# Patient Record
Sex: MALE | Race: BLACK OR AFRICAN AMERICAN | NOT HISPANIC OR LATINO | Employment: FULL TIME | URBAN - METROPOLITAN AREA
[De-identification: names, ages, dates, MRNs, and addresses within clinical notes are randomized per-mention and may not be internally consistent; named-entity substitution may affect disease eponyms.]

---

## 2019-03-20 ENCOUNTER — OUTSIDE PLACE OF SERVICE (OUTPATIENT)
Dept: CARDIOLOGY | Facility: CLINIC | Age: 59
End: 2019-03-20
Payer: COMMERCIAL

## 2019-03-20 PROCEDURE — 93010 PR ELECTROCARDIOGRAM REPORT: ICD-10-PCS | Mod: S$GLB,,, | Performed by: INTERNAL MEDICINE

## 2019-03-20 PROCEDURE — 93010 ELECTROCARDIOGRAM REPORT: CPT | Mod: S$GLB,,, | Performed by: INTERNAL MEDICINE

## 2020-07-07 ENCOUNTER — HOSPITAL ENCOUNTER (EMERGENCY)
Facility: HOSPITAL | Age: 60
Discharge: HOME | End: 2020-07-07
Attending: EMERGENCY MEDICINE
Payer: COMMERCIAL

## 2020-07-07 VITALS
WEIGHT: 180 LBS | HEART RATE: 58 BPM | BODY MASS INDEX: 26.66 KG/M2 | SYSTOLIC BLOOD PRESSURE: 148 MMHG | TEMPERATURE: 99 F | OXYGEN SATURATION: 98 % | HEIGHT: 69 IN | RESPIRATION RATE: 18 BRPM | DIASTOLIC BLOOD PRESSURE: 94 MMHG

## 2020-07-07 DIAGNOSIS — R10.9 FLANK PAIN: Primary | ICD-10-CM

## 2020-07-07 LAB
ALBUMIN SERPL BCP-MCNC: 4.9 G/DL (ref 3.5–5.2)
ALP SERPL-CCNC: 85 U/L (ref 38–126)
ALT SERPL W/O P-5'-P-CCNC: 27 U/L (ref 10–44)
ANION GAP SERPL CALC-SCNC: 10 MMOL/L (ref 8–16)
AST SERPL-CCNC: 30 U/L (ref 15–46)
BASOPHILS # BLD AUTO: 0.02 K/UL (ref 0–0.2)
BASOPHILS NFR BLD: 0.4 % (ref 0–1.9)
BILIRUB SERPL-MCNC: 0.8 MG/DL (ref 0.1–1)
BILIRUB UR QL STRIP: NEGATIVE
BUN SERPL-MCNC: 11 MG/DL (ref 2–20)
CALCIUM SERPL-MCNC: 9.9 MG/DL (ref 8.7–10.5)
CHLORIDE SERPL-SCNC: 103 MMOL/L (ref 95–110)
CLARITY UR REFRACT.AUTO: CLEAR
CO2 SERPL-SCNC: 30 MMOL/L (ref 23–29)
COLOR UR AUTO: YELLOW
CREAT SERPL-MCNC: 0.98 MG/DL (ref 0.5–1.4)
DIFFERENTIAL METHOD: NORMAL
EOSINOPHIL # BLD AUTO: 0 K/UL (ref 0–0.5)
EOSINOPHIL NFR BLD: 0.8 % (ref 0–8)
ERYTHROCYTE [DISTWIDTH] IN BLOOD BY AUTOMATED COUNT: 12.8 % (ref 11.5–14.5)
EST. GFR  (AFRICAN AMERICAN): >60 ML/MIN/1.73 M^2
EST. GFR  (NON AFRICAN AMERICAN): >60 ML/MIN/1.73 M^2
GLUCOSE SERPL-MCNC: 108 MG/DL (ref 70–110)
GLUCOSE UR QL STRIP: NEGATIVE
GROUP A STREP, MOLECULAR: NEGATIVE
HCT VFR BLD AUTO: 45.3 % (ref 40–54)
HGB BLD-MCNC: 15.5 G/DL (ref 14–18)
HGB UR QL STRIP: NEGATIVE
IMM GRANULOCYTES # BLD AUTO: 0.01 K/UL (ref 0–0.04)
IMM GRANULOCYTES NFR BLD AUTO: 0.2 % (ref 0–0.5)
KETONES UR QL STRIP: NEGATIVE
LEUKOCYTE ESTERASE UR QL STRIP: NEGATIVE
LIPASE SERPL-CCNC: 38 U/L (ref 23–300)
LYMPHOCYTES # BLD AUTO: 1.3 K/UL (ref 1–4.8)
LYMPHOCYTES NFR BLD: 25.5 % (ref 18–48)
MCH RBC QN AUTO: 28.7 PG (ref 27–31)
MCHC RBC AUTO-ENTMCNC: 34.2 G/DL (ref 32–36)
MCV RBC AUTO: 84 FL (ref 82–98)
MONOCYTES # BLD AUTO: 0.4 K/UL (ref 0.3–1)
MONOCYTES NFR BLD: 6.8 % (ref 4–15)
NEUTROPHILS # BLD AUTO: 3.4 K/UL (ref 1.8–7.7)
NEUTROPHILS NFR BLD: 66.3 % (ref 38–73)
NITRITE UR QL STRIP: NEGATIVE
NRBC BLD-RTO: 0 /100 WBC
PH UR STRIP: 7 [PH] (ref 5–8)
PLATELET # BLD AUTO: 230 K/UL (ref 150–350)
PMV BLD AUTO: 9.9 FL (ref 9.2–12.9)
POTASSIUM SERPL-SCNC: 4.2 MMOL/L (ref 3.5–5.1)
PROT SERPL-MCNC: 8.8 G/DL (ref 6–8.4)
PROT UR QL STRIP: NEGATIVE
RBC # BLD AUTO: 5.4 M/UL (ref 4.6–6.2)
SODIUM SERPL-SCNC: 143 MMOL/L (ref 136–145)
SP GR UR STRIP: 1.01 (ref 1–1.03)
URN SPEC COLLECT METH UR: NORMAL
UROBILINOGEN UR STRIP-ACNC: NEGATIVE EU/DL
WBC # BLD AUTO: 5.17 K/UL (ref 3.9–12.7)

## 2020-07-07 PROCEDURE — 83690 ASSAY OF LIPASE: CPT | Mod: ER

## 2020-07-07 PROCEDURE — 80053 COMPREHEN METABOLIC PANEL: CPT | Mod: ER

## 2020-07-07 PROCEDURE — 99284 EMERGENCY DEPT VISIT MOD MDM: CPT | Mod: 25,ER

## 2020-07-07 PROCEDURE — 85025 COMPLETE CBC W/AUTO DIFF WBC: CPT | Mod: ER

## 2020-07-07 PROCEDURE — 81003 URINALYSIS AUTO W/O SCOPE: CPT | Mod: ER

## 2020-07-07 PROCEDURE — 87651 STREP A DNA AMP PROBE: CPT | Mod: ER

## 2020-07-07 RX ORDER — LOVASTATIN 40 MG/1
40 TABLET ORAL NIGHTLY
COMMUNITY

## 2020-07-07 RX ORDER — TRAMADOL HYDROCHLORIDE 50 MG/1
50 TABLET ORAL EVERY 12 HOURS PRN
Qty: 6 TABLET | Refills: 0 | Status: SHIPPED | OUTPATIENT
Start: 2020-07-07

## 2020-07-07 RX ORDER — LOSARTAN POTASSIUM 50 MG/1
50 TABLET ORAL DAILY
COMMUNITY

## 2020-07-07 RX ORDER — METFORMIN HYDROCHLORIDE 500 MG/1
500 TABLET ORAL 2 TIMES DAILY WITH MEALS
COMMUNITY

## 2020-07-07 RX ORDER — AMLODIPINE BESYLATE 10 MG/1
10 TABLET ORAL DAILY
COMMUNITY

## 2020-07-07 NOTE — ED PROVIDER NOTES
Encounter Date: 7/7/2020       History     Chief Complaint   Patient presents with    Right sided pain     started 2-3 weeks      59-year-old male presents to the emergency department for evaluation 3 week history of intermittent right-sided flank pain.  He reports that the symptoms began gradually 3 weeks ago and have been intermittent since onset.  He denies any abdominal pain, nausea, vomiting, diarrhea, dysuria or hematuria.  He does report that he had a history of a kidney stone back in 2003 which required surgery to remove the stone..  He denies any nausea, vomiting, diarrhea, fever, headache, dizziness or vision changes.  He does report a mild sore throat that has been intermittent for approximately 1 month.  He denies any chest pain, shortness of breath, cough, palpitations or leg swelling.  He reports that he has not been taking his metformin as he feels like a give some dark spots on his legs.  He reports that he has not been to his primary care provider in several months secondary to COVID protocols.  Patient reports currently he is any flank pain.        Review of patient's allergies indicates:  No Known Allergies  Past Medical History:   Diagnosis Date    Diabetes mellitus     Hyperlipidemia     Hypertension      Past Surgical History:   Procedure Laterality Date    KIDNEY STONE SURGERY       History reviewed. No pertinent family history.  Social History     Tobacco Use    Smoking status: Never Smoker   Substance Use Topics    Alcohol use: Yes     Comment: occ    Drug use: Not on file     Review of Systems   Constitutional: Negative for activity change, appetite change and fever.   HENT: Positive for sore throat. Negative for congestion, nosebleeds, rhinorrhea, sinus pressure, trouble swallowing and voice change.    Eyes: Negative for photophobia and visual disturbance.   Respiratory: Negative for cough, chest tightness, shortness of breath and wheezing.    Cardiovascular: Negative for chest  pain.   Gastrointestinal: Negative for abdominal pain, constipation, diarrhea, nausea and vomiting.   Genitourinary: Positive for flank pain. Negative for decreased urine volume, dysuria, hematuria, penile swelling and scrotal swelling.   Musculoskeletal: Negative for back pain, joint swelling, neck pain and neck stiffness.   Skin: Negative for rash.   Neurological: Negative for dizziness, syncope, weakness, light-headedness, numbness and headaches.   Hematological: Does not bruise/bleed easily.       Physical Exam     Initial Vitals [07/07/20 1037]   BP Pulse Resp Temp SpO2   (!) 185/93 63 18 99 °F (37.2 °C) 98 %      MAP       --         Physical Exam    Nursing note and vitals reviewed.  Constitutional: He appears well-developed and well-nourished. He is not diaphoretic. No distress.   HENT:   Head: Normocephalic and atraumatic.   Right Ear: External ear normal.   Left Ear: External ear normal.   Mouth/Throat: Oropharynx is clear and moist. No oropharyngeal exudate.   Eyes: Conjunctivae and EOM are normal. Pupils are equal, round, and reactive to light.   Neck: Normal range of motion. Neck supple.   Cardiovascular: Normal rate, regular rhythm and normal heart sounds. Exam reveals no gallop and no friction rub.    No murmur heard.  Pulmonary/Chest: Breath sounds normal. No respiratory distress. He has no wheezes. He has no rhonchi. He has no rales. He exhibits no tenderness.   Abdominal: Soft. Bowel sounds are normal. He exhibits no distension. There is no abdominal tenderness. There is CVA tenderness (Right-sided). There is no guarding, no tenderness at McBurney's point and negative Reyes's sign.   Lymphadenopathy:     He has no cervical adenopathy.   Neurological: He is alert and oriented to person, place, and time.   Skin: Skin is warm and dry.   Psychiatric: He has a normal mood and affect.         ED Course   Procedures  Labs Reviewed   GROUP A STREP, MOLECULAR   CBC W/ AUTO DIFFERENTIAL   URINALYSIS, REFLEX  TO URINE CULTURE   COMPREHENSIVE METABOLIC PANEL   LIPASE          Imaging Results    None          Medical Decision Making:   Initial Assessment:   59-year-old male presents for evaluation of 3 week history of intermittent right-sided flank pain.  Physical exam reveals a nontoxic-appearing male in no acute distress.  Patient is mildly hypertensive, but other vital signs are within normal limits.  Neurological exam reveals an alert and oriented patient.  Lungs clear to auscultation bilaterally.  No respiratory distress or accessory muscle use noted.  Abdominal exam reveals soft abdomen, nontender to palpation.  Right-sided CVA tenderness noted.  Patient declined  exam.  Differential Diagnosis:   CT of the abdomen/pelvis ordered to assess possible renal stone.  UTI  Pyelonephritis  Lumbar strain  Hyperglycemia  ED Management:  Urinalysis reveals no evidence of urinary tract infection or hematuria.  Group a strep negative.  CBC reveals no acute leukocytosis or anemia.  CMP results within normal limits.  Lipase 38.  CT of the abdomen/pelvis report reveals no acute findings.  Nonobstructive bilateral nephrolithiasis noted.  A few right-sided renal cysts are noted.  Discussed these findings at length with the patient who verbalizes understanding and agreement course of treatment.  Patient reports that he is currently not in any pain.  Instructed patient to follow up with his primary care provider for re-evaluation and to return to the emergency department immediately for any new or worsening symptoms.  Instructed patient to follow up with urology for re-evaluation and to return to the emergency department immediately for any new or worsening symptoms.  Discussed this patient at length with   who is in agreement course of treatment.                                 Clinical Impression:       ICD-10-CM ICD-9-CM   1. Flank pain  R10.9 789.09                                Kylah Recinos PA-C  07/07/20  [id number]

## 2020-07-07 NOTE — Clinical Note
Monique Murguia was seen and treated in our emergency department on 7/7/2020.  He may return to work on 07/10/2020.       If you have any questions or concerns, please don't hesitate to call.      TROY aVlverde RN

## 2020-07-07 NOTE — DISCHARGE INSTRUCTIONS
You are instructed to follow up with your urologist for re-evaluation within 3 days.  You are instructed to return to the emergency department immediately for any new or worsening symptom.

## 2023-01-26 ENCOUNTER — TELEPHONE (OUTPATIENT)
Dept: NEUROSURGERY | Facility: CLINIC | Age: 63
End: 2023-01-26
Payer: MEDICAID

## 2023-01-26 NOTE — TELEPHONE ENCOUNTER
Received fax referral. Called to have imaging sent to make appt. Pt took address and phone number and stated he would have the disc sent.

## 2023-01-30 ENCOUNTER — TELEPHONE (OUTPATIENT)
Dept: NEUROSURGERY | Facility: CLINIC | Age: 63
End: 2023-01-30
Payer: MEDICAID

## 2023-01-30 NOTE — TELEPHONE ENCOUNTER
----- Message from Sandra Hanson sent at 1/30/2023  9:51 AM CST -----  Regarding: Pt Advice  Contact: [phone]  Pt is checking to see if your office received the disc from [facility] in [facility].  Please call.

## 2023-02-06 ENCOUNTER — TELEPHONE (OUTPATIENT)
Dept: NEUROSURGERY | Facility: CLINIC | Age: 63
End: 2023-02-06
Payer: MEDICAID

## 2023-02-06 NOTE — TELEPHONE ENCOUNTER
----- Message from Karo Mobley sent at 2/6/2023  1:30 PM CST -----  Contact: self @ [phone]  Pt is calling to see if you received his MRI on disc being sent over by [facility].  Pls call.

## 2023-02-06 NOTE — TELEPHONE ENCOUNTER
Called and spoke with pt. Explained we do not have the disc yet. Pt stated he is a  and needs to know if he can drive. I asked if anyone told him he should not drive and he stated no. I said I need the disc in order to make the appointment. If you are seen with out the disc we are not going to be able to  anything. We need to see what is going on. Pt verbalized understanding and stated he will get it and bring to us.

## 2023-02-08 ENCOUNTER — TELEPHONE (OUTPATIENT)
Dept: NEUROSURGERY | Facility: CLINIC | Age: 63
End: 2023-02-08
Payer: MEDICAID

## 2023-02-08 NOTE — TELEPHONE ENCOUNTER
----- Message from Radha Muñiz sent at 2/8/2023 11:11 AM CST -----  Contact: pt @ [phone]  Monique Felton calling regarding Patient Advice (message) for #pt is calling to see if office recieve his disc. Asking for call back

## 2023-02-09 ENCOUNTER — TELEPHONE (OUTPATIENT)
Dept: NEUROSURGERY | Facility: CLINIC | Age: 63
End: 2023-02-09
Payer: MEDICAID

## 2023-02-09 NOTE — TELEPHONE ENCOUNTER
----- Message from Shea Florez sent at 2/9/2023  2:09 PM CST -----  Regarding: pt advice  Contact: [phone]  Pt requesting a call regarding his MRI disc. PT needs to confirm if doctor received disc. Please call to discuss further.

## 2023-02-14 ENCOUNTER — OFFICE VISIT (OUTPATIENT)
Dept: NEUROSURGERY | Facility: CLINIC | Age: 63
End: 2023-02-14
Payer: MEDICAID

## 2023-02-14 VITALS — DIASTOLIC BLOOD PRESSURE: 82 MMHG | SYSTOLIC BLOOD PRESSURE: 155 MMHG | HEART RATE: 61 BPM

## 2023-02-14 DIAGNOSIS — I67.82 CEREBRAL ISCHEMIA: Primary | ICD-10-CM

## 2023-02-14 PROCEDURE — 99999 PR PBB SHADOW E&M-EST. PATIENT-LVL III: CPT | Mod: PBBFAC,,, | Performed by: NEUROLOGICAL SURGERY

## 2023-02-14 PROCEDURE — 99999 PR PBB SHADOW E&M-EST. PATIENT-LVL III: ICD-10-PCS | Mod: PBBFAC,,, | Performed by: NEUROLOGICAL SURGERY

## 2023-02-14 PROCEDURE — 99203 PR OFFICE/OUTPT VISIT, NEW, LEVL III, 30-44 MIN: ICD-10-PCS | Mod: S$PBB,,, | Performed by: NEUROLOGICAL SURGERY

## 2023-02-14 PROCEDURE — 99203 OFFICE O/P NEW LOW 30 MIN: CPT | Mod: S$PBB,,, | Performed by: NEUROLOGICAL SURGERY

## 2023-02-14 PROCEDURE — 99213 OFFICE O/P EST LOW 20 MIN: CPT | Mod: PBBFAC | Performed by: NEUROLOGICAL SURGERY

## 2023-02-14 RX ORDER — ASPIRIN 81 MG/1
81 TABLET ORAL DAILY
COMMUNITY

## 2023-02-14 RX ORDER — CLOPIDOGREL BISULFATE 75 MG/1
75 TABLET ORAL DAILY
COMMUNITY

## 2023-02-14 NOTE — PROGRESS NOTES
Neurosurgery  History & Physical    SUBJECTIVE:     Chief Complaint:  Cerebral ischemia    History of Present Illness:  62-year-old man with a past medical history of hypertension, hyperlipidemia, diabetes.  Concern for possible ischemia with head maneuver.  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    Review of patient's allergies indicates:  No Known Allergies    Current Outpatient Medications   Medication Sig Dispense Refill    amLODIPine (NORVASC) 10 MG tablet Take 10 mg by mouth once daily.      aspirin (ECOTRIN) 81 MG EC tablet Take 81 mg by mouth once daily.      clopidogreL (PLAVIX) 75 mg tablet Take 75 mg by mouth once daily.      losartan (COZAAR) 50 MG tablet Take 50 mg by mouth once daily.      lovastatin (MEVACOR) 40 MG tablet Take 40 mg by mouth every evening.      metFORMIN (GLUCOPHAGE) 500 MG tablet Take 500 mg by mouth 2 (two) times daily with meals.      traMADoL (ULTRAM) 50 mg tablet Take 1 tablet (50 mg total) by mouth every 12 (twelve) hours as needed for Pain. (Patient not taking: Reported on 2/14/2023) 6 tablet 0     No current facility-administered medications for this visit.       Past Medical History:   Diagnosis Date    Diabetes mellitus     Hyperlipidemia     Hypertension      Past Surgical History:   Procedure Laterality Date    KIDNEY STONE SURGERY       Family History    None       Social History     Socioeconomic History    Marital status:    Tobacco Use    Smoking status: Never   Substance and Sexual Activity    Alcohol use: Yes     Comment: occ       Review of Systems    OBJECTIVE:     Vital Signs  Pulse: 61  BP: (!) 155/82  Pain Score:   6  There is no height or weight on file to calculate BMI.      Neurosurgery Physical Exam      Diagnostic Results:  I personally reviewed patient's Diagnostic Imaging.  Poorly opacified left vertebral artery in the V4 segment.  No evidence of ischemia on DWI.  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      ASSESSMENT/PLAN:     62-year-old male concern for ischemia,    1. Recommend CTA, CT perfusion, and will likely need dynamic cerebral angiogram.    Agustin Goff MD,MSc  Department of Neurosurgery   Department of Radiology  Department of Neurology  [facility]    [facility] [employer] [facility] [employer]    CTA   CT perfusion   Dynamic cerebral angiogram    Note dictated with voice recognition software, please excuse any grammatical errors.

## 2023-02-15 ENCOUNTER — TELEPHONE (OUTPATIENT)
Dept: NEUROSURGERY | Facility: CLINIC | Age: 63
End: 2023-02-15
Payer: MEDICAID

## 2023-02-15 DIAGNOSIS — I66.01 MIDDLE CEREBRAL ARTERY STENOSIS, RIGHT: Primary | ICD-10-CM

## 2023-03-07 ENCOUNTER — TELEPHONE (OUTPATIENT)
Dept: NEUROSURGERY | Facility: CLINIC | Age: 63
End: 2023-03-07
Payer: MEDICAID

## 2023-03-07 NOTE — TELEPHONE ENCOUNTER
----- Message from Asher Villalpando MA sent at 3/7/2023 10:30 AM CST -----  Regarding: procedures sent to [facility]  Contact: [phone]  Patient is callind to have orders for CTA sent to [facility]. Please call and advise the patient.

## 2023-03-07 NOTE — TELEPHONE ENCOUNTER
Returned call to pt. Explained [facility] can not do the CT perfusion. Explained to pt needs to be done at main campus. Once scheduled will call pt. Pt verbalized understanding.

## 2023-03-09 ENCOUNTER — TELEPHONE (OUTPATIENT)
Dept: NEUROSURGERY | Facility: CLINIC | Age: 63
End: 2023-03-09
Payer: MEDICAID

## 2023-03-09 NOTE — TELEPHONE ENCOUNTER
Called pt and notified CT scheduled for 03/14/23 at 815. Need to fast 4 hours before ok to drink water. Follow up appt with dr. Goff scheduled. Pt confirmed all appts.

## 2023-03-13 ENCOUNTER — TELEPHONE (OUTPATIENT)
Dept: NEUROSURGERY | Facility: CLINIC | Age: 63
End: 2023-03-13
Payer: MEDICAID

## 2023-03-13 NOTE — TELEPHONE ENCOUNTER
----- Message from Сергей Casanova sent at 3/13/2023  9:46 AM CDT -----  Contact: [phone]  Pt calling to see if he can get his ct scan in [facility] instead of coming to [place] on tomorrow please advise [phone]

## 2023-03-13 NOTE — TELEPHONE ENCOUNTER
----- Message from Ellyn Crenshaw sent at 3/13/2023 10:33 AM CDT -----  Regarding: Order to be fax to [facility]  Contact: [phone]  Monique Felton calling regarding Orders (message) for CTA HEAD AND NECK.  He stated he have Medicaid he could go anyway to have this done.  Do the doctor prefer him to have it done at an [facility]?  He is look for somewhere closer to home Call back  [phone]

## 2023-03-13 NOTE — TELEPHONE ENCOUNTER
Returned call to pt. Left  [facility] I could schedule for [facility]. Left clinic number to return call.

## 2023-03-16 ENCOUNTER — HOSPITAL ENCOUNTER (OUTPATIENT)
Dept: RADIOLOGY | Facility: HOSPITAL | Age: 63
Discharge: HOME | End: 2023-03-16
Attending: NEUROLOGICAL SURGERY
Payer: MEDICAID

## 2023-03-16 DIAGNOSIS — I66.01 MIDDLE CEREBRAL ARTERY STENOSIS, RIGHT: ICD-10-CM

## 2023-03-16 PROCEDURE — 25500020 PHARM REV CODE 255: Performed by: NEUROLOGICAL SURGERY

## 2023-03-16 PROCEDURE — 0042T CT BRAIN PERFUSION INC POST PROCESSING: CPT

## 2023-03-16 PROCEDURE — 0042T CT BRAIN PERFUSION INC POST PROCESSING: CPT | Mod: ,,, | Performed by: RADIOLOGY

## 2023-03-16 PROCEDURE — 70498 CT ANGIOGRAPHY NECK: CPT | Mod: 26,,, | Performed by: RADIOLOGY

## 2023-03-16 PROCEDURE — 70496 CTA HEAD AND NECK (XPD): ICD-10-PCS | Mod: 26,,, | Performed by: RADIOLOGY

## 2023-03-16 PROCEDURE — 70498 CTA HEAD AND NECK (XPD): ICD-10-PCS | Mod: 26,,, | Performed by: RADIOLOGY

## 2023-03-16 PROCEDURE — 70496 CT ANGIOGRAPHY HEAD: CPT | Mod: TC

## 2023-03-16 PROCEDURE — 0042T CT BRAIN PERFUSION INC POST PROCESSING: ICD-10-PCS | Mod: ,,, | Performed by: RADIOLOGY

## 2023-03-16 PROCEDURE — 70496 CT ANGIOGRAPHY HEAD: CPT | Mod: 26,,, | Performed by: RADIOLOGY

## 2023-03-16 RX ADMIN — IOHEXOL 150 ML: 350 INJECTION, SOLUTION INTRAVENOUS at 12:03

## 2023-04-04 ENCOUNTER — TELEPHONE (OUTPATIENT)
Dept: NEUROSURGERY | Facility: CLINIC | Age: 63
End: 2023-04-04
Payer: MEDICAID

## 2023-04-04 NOTE — TELEPHONE ENCOUNTER
----- Message from Radha Muñiz sent at 4/4/2023  2:41 PM CDT -----  Contact: pt @ [phone]  Monique Felton calling regarding Patient Advice (message) for #pt is calling about appt he was supposed to have on yesterday, asking for call back

## 2023-04-06 ENCOUNTER — PATIENT MESSAGE (OUTPATIENT)
Dept: NEUROSURGERY | Facility: CLINIC | Age: 63
End: 2023-04-06
Payer: MEDICAID

## 2023-04-10 ENCOUNTER — TELEPHONE (OUTPATIENT)
Dept: NEUROSURGERY | Facility: CLINIC | Age: 63
End: 2023-04-10
Payer: MEDICAID

## 2023-04-10 NOTE — TELEPHONE ENCOUNTER
----- Message from Laxmi Raymond sent at 4/10/2023  9:08 AM CDT -----  Regarding: appt access  Contact: [phone]  Pt calling to schedule an audio visit with provider attempted to do so nothing available. Chritsopher riojas

## 2023-04-17 ENCOUNTER — PATIENT MESSAGE (OUTPATIENT)
Dept: NEUROSURGERY | Facility: CLINIC | Age: 63
End: 2023-04-17
Payer: MEDICAID

## 2023-04-17 ENCOUNTER — TELEPHONE (OUTPATIENT)
Dept: NEUROSURGERY | Facility: CLINIC | Age: 63
End: 2023-04-17
Payer: MEDICAID

## 2023-04-18 ENCOUNTER — OFFICE VISIT (OUTPATIENT)
Dept: NEUROSURGERY | Facility: CLINIC | Age: 63
End: 2023-04-18
Payer: MEDICAID

## 2023-04-18 ENCOUNTER — TELEPHONE (OUTPATIENT)
Dept: NEUROSURGERY | Facility: CLINIC | Age: 63
End: 2023-04-18

## 2023-04-18 DIAGNOSIS — I67.82 CEREBRAL ISCHEMIA: ICD-10-CM

## 2023-04-18 DIAGNOSIS — I67.2 ATHEROSCLEROTIC CEREBROVASCULAR DISEASE: Primary | ICD-10-CM

## 2023-04-18 DIAGNOSIS — I65.09 BOW HUNTER'S STROKE: ICD-10-CM

## 2023-04-18 DIAGNOSIS — I66.01 MIDDLE CEREBRAL ARTERY STENOSIS, RIGHT: ICD-10-CM

## 2023-04-18 PROCEDURE — 1160F RVW MEDS BY RX/DR IN RCRD: CPT | Mod: CPTII,95,, | Performed by: NEUROLOGICAL SURGERY

## 2023-04-18 PROCEDURE — 1159F PR MEDICATION LIST DOCUMENTED IN MEDICAL RECORD: ICD-10-PCS | Mod: CPTII,95,, | Performed by: NEUROLOGICAL SURGERY

## 2023-04-18 PROCEDURE — 99215 PR OFFICE/OUTPT VISIT, EST, LEVL V, 40-54 MIN: ICD-10-PCS | Mod: 95,,, | Performed by: NEUROLOGICAL SURGERY

## 2023-04-18 PROCEDURE — 99215 OFFICE O/P EST HI 40 MIN: CPT | Mod: 95,,, | Performed by: NEUROLOGICAL SURGERY

## 2023-04-18 PROCEDURE — 1160F PR REVIEW ALL MEDS BY PRESCRIBER/CLIN PHARMACIST DOCUMENTED: ICD-10-PCS | Mod: CPTII,95,, | Performed by: NEUROLOGICAL SURGERY

## 2023-04-18 PROCEDURE — 1159F MED LIST DOCD IN RCRD: CPT | Mod: CPTII,95,, | Performed by: NEUROLOGICAL SURGERY

## 2023-04-18 NOTE — PROGRESS NOTES
Neurosurgery  Established Patient    SUBJECTIVE:     History of Present Illness:  62-year-old man with a past medical history of hypertension, hyperlipidemia, diabetes.  Concern for possible ischemia with head maneuver.  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    Interval history 04/18/2023:   Patient returns for virtual audio visual visit after undergoing CTA of the head and neck as well as CT perfusion.  Patient again reiterates, that he has symptoms if he moves his head back to fast, typically moving from front to back.  He feels like he wants to passed out, but never does.  He feels like it is difficult to explain.  Sometimes he feels like it is fatigue.  He feels like now that he is on aspirin, Plavix, and amlodipine he does feel like he feels slightly better.  He is unable to articulate any specific time and happens, he does feel like it happens maybe more in the morning time.  He denies any paresthesias that happened.  He feels like his brain just kind of goes blank.  He does note that he has blurry vision for about 10 minutes afterwards.  But it does not happen every single time.  And only happens some time.  This was a virtual audio visual visit, the patient's spouse is there to help augment his history, as well as patient is somewhat hard of hearing and she assists in that regard as well.      Review of patient's allergies indicates:  No Known Allergies    Current Outpatient Medications   Medication Sig Dispense Refill    amLODIPine (NORVASC) 10 MG tablet Take 10 mg by mouth once daily.      aspirin (ECOTRIN) 81 MG EC tablet Take 81 mg by mouth once daily.      clopidogreL (PLAVIX) 75 mg tablet Take 75 mg by mouth once daily.      losartan (COZAAR) 50 MG tablet Take 50 mg by mouth once daily.      lovastatin (MEVACOR) 40 MG tablet Take 40 mg by mouth every evening.      metFORMIN (GLUCOPHAGE) 500 MG tablet Take 500 mg by mouth 2 (two) times daily with meals.      traMADoL  (ULTRAM) 50 mg tablet Take 1 tablet (50 mg total) by mouth every 12 (twelve) hours as needed for Pain. (Patient not taking: Reported on 2/14/2023) 6 tablet 0     No current facility-administered medications for this visit.       Past Medical History:   Diagnosis Date    Diabetes mellitus     Hyperlipidemia     Hypertension      Past Surgical History:   Procedure Laterality Date    KIDNEY STONE SURGERY       Family History    None       Social History     Socioeconomic History    Marital status:    Tobacco Use    Smoking status: Never   Substance and Sexual Activity    Alcohol use: Yes     Comment: occ       Review of Systems    OBJECTIVE:     Vital Signs     There is no height or weight on file to calculate BMI.    Neurosurgery Physical Exam    On virtual audio visual visit   Head is normocephalic atraumatic   Neck is grossly supple  No appreciable labored breathing   Admitting appears to be nontender   Patient is able to move extremities     On virtual audio visual visit the patient's speech is fluent, goal directed without any noted dysarthria or aphasia   Unable to evaluate pupils on virtual audio visual visit   Face is symmetric   Tongue is midline  Unable to evaluate palate   Hearing appears to be intact on virtual audio visual visit to voice   Patient able to move both upper and lower extremities without any gross motor asymmetry on virtual audio visual visit     Diagnostic Results:  I personally reviewed patient's Diagnostic Imaging.  Poorly opacified left vertebral artery in the V4 segment.  No evidence of ischemia on DWI.  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      CT perfusion with slightly increased time to peak on T-max.  No significant changes on mean transient time.  No significant decrement in cerebral blood flow or cerebral blood volume.  There is multifocal atherosclerotic changes in the intracranial vasculature.  There  does appear to be bilateral PCA stenosis, at P1 on the left and P1 on the right.  This also hypoplasia of the left vertebral artery which appears to effectively terminate in PICA.  There does appear to be some communication from PICA on the left in the V4 segment but appears to be atheroscleroticly occlusive.  The primary flow to the vertebrobasilar system appears to be primarily from the dominant right vertebral artery.  There are no clear areas of compression but unable to fully appreciate on this static image there is some close proximity of the occiput to the V3 segment which could facilitate compression with movement.            ASSESSMENT/PLAN:     62-year-old man with ICA D, concern for positional worsening component.    1. Patient is somewhat hard of hearing, on virtual audio visual visit but otherwise no focal motor or sensory deficits on examination     2.  CT perfusion scan with some increased time to peak on T-max, otherwise no changes in mean transit time, cerebral blood flow, cerebral blood volume.  CTA head and neck with some focal atherosclerotic changes in the bilateral PCA, right P1, left P1.  There is affectively no contribution to the vertebrobasilar system from the left vertebral artery, hypoplastic which appears effectively and in left PICA.  No right vertebral artery origin stenosis, no clear area of stenosis throughout the V1, V2, or V3 distribution.    3. Had long discussion with the patient, the patient's spouse.  Given that patient continues to have these symptoms when he turns his head, this may be somewhat exacerbated by the bilateral PCA stenosis and given patient has blurry vision for approximately 10 minutes this may be affecting primary visual cortex.  Would recommend cerebral angiogram, with dynamic head turning as well to assess for any decrement in blood flow in the vertebrobasilar system with motion.  I discussed with the patient, the patient's spouse the risks, benefits, and  alternatives to cerebral angiogram; including but not limited to heart attack coma, stroke, infection, bleeding, paralysis, even death.  We will obtain informed consent placed in chart when appropriate and proceed with dynamic, diagnostic cerebral angiogram.    Thank you so very much for allowing me to participate in the care of this patient.  Please feel free to call with any questions, comments, or concerns.    Agustin Goff MD,MSc  Department of Neurosurgery   Department of Radiology  Department of Neurology  [facility]    [facility]   [facility] [employer] [facility]    Total time spent in counseling and discussion about further management options including relevant lab work, treatment,  prognosis, medications and intended side effects was more than 60 minutes. More than 50 % of the time was spent in counseling and coordination of care.  I spent a total of 60 minutes on the day of the visit.This includes face to face time and non-face to face time preparing to see the patient (eg, review of tests), Obtaining and/or reviewing separately obtained history, Documenting clinical information in the electronic or other health record, Independently interpreting resultsand communicating results to the patient/family/caregiver, or Care coordination     Virtual visit Attestation   The patient location is: Home  The chief complaint leading to consultation is:  ICA D, cerebral ischemia  Visit type: audiovisual  Total time spent with patient: 40 min     Each patient to whom he or she provides medical services by telemedicine is:  (1) informed of the relationship between the physician and patient and the respective role of any other health care provider with respect to management of the patient; and (2) notified that he or she may decline to receive medical services by telemedicine and may withdraw from such care  at any time.         Note dictated with voice recognition software, please excuse any grammatical errors.

## 2023-04-20 ENCOUNTER — TELEPHONE (OUTPATIENT)
Dept: NEUROSURGERY | Facility: CLINIC | Age: 63
End: 2023-04-20
Payer: MEDICAID

## 2023-04-20 NOTE — TELEPHONE ENCOUNTER
Pt returned call. Not able to do angio on 04/27/23. Would like to have it at end of May. Reviewed angiogram instructions. Written copy mailed to pt. Pt verbalized understanding.

## 2023-05-17 ENCOUNTER — TELEPHONE (OUTPATIENT)
Dept: NEUROSURGERY | Facility: CLINIC | Age: 63
End: 2023-05-17
Payer: MEDICAID

## 2023-05-17 NOTE — TELEPHONE ENCOUNTER
Called pt and left vm angiogram has been changed to 06/15/23. Left clinic number to return call if any questions,.

## 2023-06-07 ENCOUNTER — TELEPHONE (OUTPATIENT)
Dept: NEUROSURGERY | Facility: CLINIC | Age: 63
End: 2023-06-07
Payer: MEDICAID

## 2023-06-07 NOTE — TELEPHONE ENCOUNTER
Called pt and left vm with schedule of labs and angiogram. Left clinic number for pt to return call.

## 2023-06-07 NOTE — TELEPHONE ENCOUNTER
Pt returned call. Instructed on labs for Monday. Pt requested to have done at [facility]. Appt changed. Instructed to report to 3rd floor short stay cardiac unit at 6am on Thursday. Npo after midnight except b/p meds morning of with small sip of water. Shower in am with antibacterial soap. No perfume,cologne,powder after shower. Will need someone to drive home. Need to be watched for about 8 hours after procedure once home. Pt verbalized understanding.

## 2023-06-09 ENCOUNTER — TELEPHONE (OUTPATIENT)
Dept: NEUROSURGERY | Facility: CLINIC | Age: 63
End: 2023-06-09
Payer: MEDICAID

## 2023-06-09 NOTE — TELEPHONE ENCOUNTER
----- Message from Sandra Hanson sent at 6/9/2023  3:19 PM CDT -----  Regarding: Appt Access  Contact: [phone]  Pt is calling to r/s his appt on 06/15/23 to 08/03/23.  Please call.

## 2023-06-09 NOTE — TELEPHONE ENCOUNTER
Call placed to pt. Left vm angiogram was cancelled. Unable to schedule for august yet. Will let them know once scheduled.

## 2023-07-25 ENCOUNTER — TELEPHONE (OUTPATIENT)
Dept: NEUROSURGERY | Facility: CLINIC | Age: 63
End: 2023-07-25
Payer: MEDICAID

## 2023-07-25 NOTE — TELEPHONE ENCOUNTER
----- Message from Laxmi Raymond sent at 7/25/2023 11:51 AM CDT -----  Regarding: pt advice  Contact: [phone]  Pt calling in in regards to rescheduling due to job, pls call

## 2023-09-20 ENCOUNTER — TELEPHONE (OUTPATIENT)
Dept: NEUROSURGERY | Facility: CLINIC | Age: 63
End: 2023-09-20
Payer: MEDICAID

## 2023-09-20 NOTE — TELEPHONE ENCOUNTER
Called pt to discuss angiogram scheduled for 09/28/23 with labs on 09/25/23. Left this message and clinic number to return call to confirm.

## 2023-09-22 NOTE — TELEPHONE ENCOUNTER
Finally able to reach pt and wife. Instructed to have labs done on Monday 09/25/23 at [facility]. Report to 3rd floor S[facility]U on 09/28/23 for 8am. Angiogram instructed emailed to pt at his request. Did review NPO after midnight. Take amlodipine and losartan with sm sip of water morning of. Pt stated he is not taking metformin anymore and is not on any diabetic medication. Instructed to shower morning of with antibacterial soap (dove or dial). No perfume, aftershave, deodorant, creams or lotions. Pt asked about alcohol. Instructed no alcohol by mouth or skin. Will need someone to drive him home and monitor for about 8 hours. Instructed if any problems with speech, memory, vision, weakness, numbness, tingling or bleeding from site emergency room. Explained with schedule follow up appt with myself and dr. Goff. Pt and wife verbalized understanding.

## 2023-09-25 ENCOUNTER — LAB VISIT (OUTPATIENT)
Dept: LAB | Facility: HOSPITAL | Age: 63
End: 2023-09-25
Attending: NEUROLOGICAL SURGERY
Payer: MEDICAID

## 2023-09-25 DIAGNOSIS — I67.82 CEREBRAL ISCHEMIA: ICD-10-CM

## 2023-09-25 DIAGNOSIS — I66.01 MIDDLE CEREBRAL ARTERY STENOSIS, RIGHT: ICD-10-CM

## 2023-09-25 DIAGNOSIS — I67.2 ATHEROSCLEROTIC CEREBROVASCULAR DISEASE: ICD-10-CM

## 2023-09-25 LAB
ANION GAP SERPL CALC-SCNC: 10 MMOL/L (ref 8–16)
CALCIUM SERPL-MCNC: 9.8 MG/DL (ref 8.7–10.5)
CHLORIDE SERPL-SCNC: 104 MMOL/L (ref 95–110)
CO2 SERPL-SCNC: 28 MMOL/L (ref 23–29)
CREAT SERPL-MCNC: 1.03 MG/DL (ref 0.5–1.4)
EST. GFR  (NO RACE VARIABLE): >60 ML/MIN/1.73 M^2
GLUCOSE SERPL-MCNC: 122 MG/DL (ref 70–110)
POTASSIUM SERPL-SCNC: 4.2 MMOL/L (ref 3.5–5.1)
SODIUM SERPL-SCNC: 142 MMOL/L (ref 136–145)
UUN UR-MCNC: 10 MG/DL (ref 2–20)

## 2023-09-25 PROCEDURE — 36415 COLL VENOUS BLD VENIPUNCTURE: CPT | Mod: PN | Performed by: NEUROLOGICAL SURGERY

## 2023-09-25 PROCEDURE — 80048 BASIC METABOLIC PNL TOTAL CA: CPT | Mod: PN | Performed by: NEUROLOGICAL SURGERY

## 2023-09-27 ENCOUNTER — TELEPHONE (OUTPATIENT)
Dept: INTERVENTIONAL RADIOLOGY/VASCULAR | Facility: HOSPITAL | Age: 63
End: 2023-09-27
Payer: MEDICAID

## 2023-09-28 ENCOUNTER — HOSPITAL ENCOUNTER (OUTPATIENT)
Dept: INTERVENTIONAL RADIOLOGY/VASCULAR | Facility: HOSPITAL | Age: 63
Discharge: HOME | End: 2023-09-28
Attending: NEUROLOGICAL SURGERY | Admitting: NEUROLOGICAL SURGERY
Payer: MEDICAID

## 2023-09-28 VITALS
SYSTOLIC BLOOD PRESSURE: 124 MMHG | HEART RATE: 81 BPM | RESPIRATION RATE: 12 BRPM | BODY MASS INDEX: 26.66 KG/M2 | TEMPERATURE: 98 F | OXYGEN SATURATION: 100 % | HEIGHT: 69 IN | DIASTOLIC BLOOD PRESSURE: 74 MMHG | WEIGHT: 180 LBS

## 2023-09-28 DIAGNOSIS — I67.82 CEREBRAL ISCHEMIA: ICD-10-CM

## 2023-09-28 DIAGNOSIS — I66.01 MIDDLE CEREBRAL ARTERY STENOSIS, RIGHT: ICD-10-CM

## 2023-09-28 DIAGNOSIS — I67.2 ATHEROSCLEROTIC CEREBROVASCULAR DISEASE: ICD-10-CM

## 2023-09-28 LAB — POCT GLUCOSE: 95 MG/DL (ref 70–110)

## 2023-09-28 PROCEDURE — 36227 PLACE CATH XTRNL CAROTID: CPT | Mod: 50 | Performed by: NEUROLOGICAL SURGERY

## 2023-09-28 PROCEDURE — C1760 CLOSURE DEV, VASC: HCPCS

## 2023-09-28 PROCEDURE — 36224 PLACE CATH CAROTD ART: CPT | Mod: 50,,, | Performed by: NEUROLOGICAL SURGERY

## 2023-09-28 PROCEDURE — 36226 PLACE CATH VERTEBRAL ART: CPT | Mod: 50 | Performed by: NEUROLOGICAL SURGERY

## 2023-09-28 PROCEDURE — 82962 GLUCOSE BLOOD TEST: CPT

## 2023-09-28 PROCEDURE — A4550 SURGICAL TRAYS: HCPCS

## 2023-09-28 PROCEDURE — 36224 IR ANGIOGRAM CAROTID INTERNAL INC ARCH AND CEREBRAL BILAT: ICD-10-PCS | Mod: 50,,, | Performed by: NEUROLOGICAL SURGERY

## 2023-09-28 PROCEDURE — 63600175 PHARM REV CODE 636 W HCPCS: Performed by: STUDENT IN AN ORGANIZED HEALTH CARE EDUCATION/TRAINING PROGRAM

## 2023-09-28 PROCEDURE — 25500020 PHARM REV CODE 255: Performed by: NEUROLOGICAL SURGERY

## 2023-09-28 PROCEDURE — 36224 PLACE CATH CAROTD ART: CPT | Mod: 50 | Performed by: NEUROLOGICAL SURGERY

## 2023-09-28 PROCEDURE — 25000003 PHARM REV CODE 250: Performed by: STUDENT IN AN ORGANIZED HEALTH CARE EDUCATION/TRAINING PROGRAM

## 2023-09-28 PROCEDURE — 36227 PR ANGIO XTRNL CAROTD CIRC, XTRNL CAROTID, SELECTV CATH S&I: ICD-10-PCS | Mod: 50,,, | Performed by: NEUROLOGICAL SURGERY

## 2023-09-28 PROCEDURE — 36227 PLACE CATH XTRNL CAROTID: CPT | Mod: 50,,, | Performed by: NEUROLOGICAL SURGERY

## 2023-09-28 PROCEDURE — 36226 PLACE CATH VERTEBRAL ART: CPT | Mod: 51,50,, | Performed by: NEUROLOGICAL SURGERY

## 2023-09-28 PROCEDURE — 36226 PR ANGIO VERTEBRAL ARTERY +/- CERVIOCEREBRAL ARCH, VERTEBRAL ART, SELECTV CATH,S&I: ICD-10-PCS | Mod: 51,50,, | Performed by: NEUROLOGICAL SURGERY

## 2023-09-28 RX ORDER — LIDOCAINE HYDROCHLORIDE 10 MG/ML
1 INJECTION, SOLUTION EPIDURAL; INFILTRATION; INTRACAUDAL; PERINEURAL ONCE
Status: DISCONTINUED | OUTPATIENT
Start: 2023-09-28 | End: 2023-09-29 | Stop reason: HOSPADM

## 2023-09-28 RX ORDER — IODIXANOL 270 MG/ML
200 INJECTION, SOLUTION INTRAVASCULAR
Status: COMPLETED | OUTPATIENT
Start: 2023-09-28 | End: 2023-09-28

## 2023-09-28 RX ORDER — SODIUM CHLORIDE 0.9 % (FLUSH) 0.9 %
10 SYRINGE (ML) INJECTION
Status: DISCONTINUED | OUTPATIENT
Start: 2023-09-28 | End: 2023-09-29 | Stop reason: HOSPADM

## 2023-09-28 RX ORDER — SODIUM CHLORIDE 9 MG/ML
75 INJECTION, SOLUTION INTRAVENOUS CONTINUOUS
Status: DISCONTINUED | OUTPATIENT
Start: 2023-09-28 | End: 2023-09-29 | Stop reason: HOSPADM

## 2023-09-28 RX ORDER — FENTANYL CITRATE 50 UG/ML
INJECTION, SOLUTION INTRAMUSCULAR; INTRAVENOUS
Status: COMPLETED | OUTPATIENT
Start: 2023-09-28 | End: 2023-09-28

## 2023-09-28 RX ORDER — MIDAZOLAM HYDROCHLORIDE 1 MG/ML
INJECTION INTRAMUSCULAR; INTRAVENOUS
Status: COMPLETED | OUTPATIENT
Start: 2023-09-28 | End: 2023-09-28

## 2023-09-28 RX ORDER — LIDOCAINE AND PRILOCAINE 25; 25 MG/G; MG/G
CREAM TOPICAL ONCE
Status: DISCONTINUED | OUTPATIENT
Start: 2023-09-28 | End: 2023-09-29 | Stop reason: HOSPADM

## 2023-09-28 RX ORDER — LIDOCAINE HYDROCHLORIDE 10 MG/ML
INJECTION INFILTRATION; PERINEURAL
Status: COMPLETED | OUTPATIENT
Start: 2023-09-28 | End: 2023-09-28

## 2023-09-28 RX ORDER — HEPARIN SODIUM 1000 [USP'U]/ML
INJECTION, SOLUTION INTRAVENOUS; SUBCUTANEOUS
Status: COMPLETED | OUTPATIENT
Start: 2023-09-28 | End: 2023-09-28

## 2023-09-28 RX ORDER — SODIUM CHLORIDE 9 MG/ML
INJECTION, SOLUTION INTRAVENOUS
Status: COMPLETED | OUTPATIENT
Start: 2023-09-28 | End: 2023-09-28

## 2023-09-28 RX ADMIN — MIDAZOLAM HYDROCHLORIDE 0.5 MG: 2 INJECTION, SOLUTION INTRAMUSCULAR; INTRAVENOUS at 11:09

## 2023-09-28 RX ADMIN — IODIXANOL 100 ML: 270 INJECTION, SOLUTION INTRAVASCULAR at 12:09

## 2023-09-28 RX ADMIN — HEPARIN SODIUM 4000 UNITS: 1000 INJECTION, SOLUTION INTRAVENOUS; SUBCUTANEOUS at 11:09

## 2023-09-28 RX ADMIN — MIDAZOLAM HYDROCHLORIDE 1 MG: 2 INJECTION, SOLUTION INTRAMUSCULAR; INTRAVENOUS at 11:09

## 2023-09-28 RX ADMIN — LIDOCAINE HYDROCHLORIDE 10 ML: 10 INJECTION, SOLUTION INFILTRATION; PERINEURAL at 11:09

## 2023-09-28 RX ADMIN — FENTANYL CITRATE 25 MCG: 50 INJECTION, SOLUTION INTRAMUSCULAR; INTRAVENOUS at 12:09

## 2023-09-28 RX ADMIN — SODIUM CHLORIDE 75 ML/HR: 9 INJECTION, SOLUTION INTRAVENOUS at 10:09

## 2023-09-28 RX ADMIN — FENTANYL CITRATE 50 MCG: 50 INJECTION, SOLUTION INTRAMUSCULAR; INTRAVENOUS at 11:09

## 2023-09-28 RX ADMIN — FENTANYL CITRATE 25 MCG: 50 INJECTION, SOLUTION INTRAMUSCULAR; INTRAVENOUS at 11:09

## 2023-09-28 NOTE — DISCHARGE INSTRUCTIONS
Please call with any questions or concerns.      Monday thru Friday 8:00 am - 4:30 pm    Interventional Radiology   [phone]    After Hours    Ask for the Radiology Resident on call  [phone]   Cerebral Angiography    What happens during a cerebral angiography?    An IV (intravenous line is started in your arm. You may be given a medicine that helps you relax (sedative)  Youre given an injection to numb the site where the catheter will be inserted. This is usually in the groin area  A small puncture is made into the artery, and the catheter is inserted into the blood vessel. Using X-rays, the catheter is then carefully guided through the artery.  Contrast fluid is injected through the catheter into the artery. You may feel warmth or pressure in your Head, neck, or chest. You may be asked to hold your breath and be still during injections. When the procedure is complete, the catheter is removed and pressure is held at the groin or wrist.    What happens after cerebral angiography    Youll be taken to a recovery area.  You will probably need to lay flat for 2-4 hours    Once you are at home  Dont drive for 24 hours  Avoid walking bending, lifting, and taking stairs for 24 hours.  Avoid lifting anything over 5 pounds for 7 days  Be sure to follow any other instructions from your doctor    When should I call my health care provider?    Call your doctor if you have any of the following:    Severe headache, visual problems, new weakness, dizziness, or trouble speaking  Fever of 100.4 (38C) or higher lasting for 24 to 48 hours  Bleeding, swelling, or a large lump at the insertions site  Sharp or increasing pain at the insertion site  Leg pain, numbness, or a cold leg or foot  Any other symptoms your provider instructed you to report based on your medical condition.    Contact information:    For immediate concerns that are not emergent, you may call our interventional radiology clinic at [phone] or  [phone]    ** After hours and weekends: Call the paging  at [phone] and ask for the Radiology Resident on call**

## 2023-09-28 NOTE — BRIEF OP NOTE
Procedural Date:  09/28/23    Attending:  Agustin Goff MD    Fellow(s):  Amanuel Dixon MD    Preoperative Diagnoses:   Positional vertigo     Postoperative Diagnosis:  Same; no overt pathology seen     Procedure:   6-vessel cerebral angiogram without intervention     Written Informed Consent Obtained:   Yes; obtained from patient     Specimen Removed:   None     Estimated Blood Loss:  Minimal     Brief Procedure report:   A 5 Mohawk sheath was placed into the right common femoral artery and a 5 Mohawk Gigi diagnostic catheter was advanced into the aortic arch.  The R innominate, R vertebral, R common carotid, R external carotid, R internal carotid, L common carotid, L external carotid, L internal carotid & L vertebral arteries were subselected and angiography of the brain was performed. There is no evidence of aneurysm, fistula, malformation, or significant stenoocclusive disease. Further, there is no evidence of positional vertebral artery kinking or flow limitation with positional movement. Mild (<50%) bilateral carotid stenosis at the bulb was appreciated that was also not flow limiting.  A common femoral artery angiogram was performed, the sheath removed and hemostasis achieved via 5 Mohawk Mynx closure device.  No hematoma was present at the time of hemostasis.   The patient tolerated the procedure well.     Preliminary Interpretation:   1.    Mild (<50%) bilateral proximal cervical ICA disease.  2.    Otherwise normal cerebral angiogram.     (Please see Imaging report for full details)    Disposition:  [facility]

## 2023-09-28 NOTE — H&P
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 Patient states that he has symptoms if he moves his head back to fast, typically moving from front to back.  He feels like he wants to passed out, but never does.  He feels like it is difficult to explain.  Sometimes he feels like it is fatigue.  He feels like now that he is on aspirin, Plavix, and amlodipine he does feel like he feels slightly better.  He is unable to articulate any specific time and happens, he does feel like it happens maybe more in the morning time.  He denies any paresthesias that happened.  He feels like his brain just kind of goes blank.  He does note that he has blurry vision for about 10 minutes afterwards.  But it does not happen every single time.  And only happens some time.  This was a virtual audio visual visit, the patient's spouse is there to help augment his history, as well as patient is somewhat hard of hearing and she assists in that regard as well.      ROS:   10-point ROS otherwise negative      Exam:  General: AOx3, GCS [id number]  CNII-XII: Intact on detailed exam, PERRL, visual fields grossly intact, EOMi, facial sensation preserved, no facial assymetry, tongue/uvula/palate midline, shoulder shrug equal, no pronator drift  Extremities: 5/5 motor throughout, sensorium intact throughout, coordination intact throughout, DTRs 2+, no pathological reflexes, no sensory level present    General: Awake, Alert, Oriented  Head: Non-traumatic, normocephalic  Eyes: Pupils equal, EOMi  Neck: Supple, normal ROM, no tenderness to palpation  CVS: Normal rate and rhythm, distal pulses present  Pulm: Symmetric expansion, no respiratory distress  GI: Abdomen nondistended, nontender  MSK: Moves all extremities without restriction, atraumatic  Skin: Dry, intact  Psych: Normal thought content and cognition      A&P:  62M w/ PMH HTN, HLD, T2DM  who presents to [facility] for 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

## 2023-09-28 NOTE — NURSING
Pt completed his recovery without incident.  Pt given discharge info and all questions were addressed.  Pt and spouse had a good understanding of all instructions and advised to call if they have any other questions. Pt taken to parking area in a wheelchair and helped into his car.

## 2023-10-09 NOTE — PROGRESS NOTES
Monique Low a 62 y.o. male here for 2 week post op wound check.    Surgery: Pt is POD 14 FROM 09/28/23 angiogram by Dr. Goff.    DME: none    Brace in Use: none    SUBJECTIVE    New Symptoms: none      PAIN MANAGEMENT     0,       INCISION (S)    Location: rt groin    Incision Status: Pt reports Clean, Dry, CLAUDE. Edges well-approximated. No drainage or swelling noted.     PICTURE pt declined picture    INTERVENTION    Incision: None    Medication Refills Requested: None     EDUCATION    Reviewed Post Op instructions with patient/caregiver.  Keep incision CLAUDE, no lotions, creams or bandages.  Ok to shower without direct water pressure to incision. Pat dry after shower.  Do not submerge wound in bath tub or go swimming until released by surgeon.  No lifting > 10lbs.  No twisting or bending surgical area greater than 45 degrees from neutral position.  Patient encouraged to walk as much as possible but advised to walk with someone and rest as necessary.  Take over the counter stool softner/laxative for constipation.  Call your doctor or report to the Emergency Room for any signs of infection, including: increased redness, drainage, pain or fever (temperature greater than or equal to 101.5 for 24 hours). Call doctor or go to the Emergency Room if there are any localized neurological changes; problems with speech, vision, numbness, tingling, weakness, or severe headache; or for other concerns.      Written copy of post op instructions provided to patient/caregiver. All questions answered. Pt/caregiver encouraged to call clinic or send message through portal with any future concerns. Patient/caregiver verbalized understanding.     FOLLOW UP  Future Appointments   Date Time Provider Department Center   10/12/2023 10:00 AM Marycarmen Washburn RN [facility]   10/30/2023  4:00 PM Agustin Goff MD [facility]

## 2023-10-12 ENCOUNTER — CLINICAL SUPPORT (OUTPATIENT)
Dept: NEUROSURGERY | Facility: CLINIC | Age: 63
End: 2023-10-12
Payer: MEDICAID

## 2023-10-12 DIAGNOSIS — I66.01 MIDDLE CEREBRAL ARTERY STENOSIS, RIGHT: Primary | ICD-10-CM

## 2023-10-30 ENCOUNTER — OFFICE VISIT (OUTPATIENT)
Dept: NEUROSURGERY | Facility: CLINIC | Age: 63
End: 2023-10-30
Payer: MEDICAID

## 2023-10-30 DIAGNOSIS — I67.82 CEREBRAL ISCHEMIA: ICD-10-CM

## 2023-10-30 DIAGNOSIS — I66.01 MIDDLE CEREBRAL ARTERY STENOSIS, RIGHT: Primary | ICD-10-CM

## 2023-10-30 DIAGNOSIS — I67.2 ATHEROSCLEROTIC CEREBROVASCULAR DISEASE: ICD-10-CM

## 2023-10-30 DIAGNOSIS — I65.09 BOW HUNTER'S STROKE: ICD-10-CM

## 2023-10-30 PROCEDURE — 99214 OFFICE O/P EST MOD 30 MIN: CPT | Mod: 95,,, | Performed by: NEUROLOGICAL SURGERY

## 2023-10-30 NOTE — PROGRESS NOTES
Neurosurgery  Established Patient    SUBJECTIVE:     History of Present Illness:  62-year-old man with a past medical history of hypertension, hyperlipidemia, diabetes.  Concern for possible ischemia with head maneuver.  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    Interval history 04/18/2023:   Patient returns for virtual audio visual visit after undergoing CTA of the head and neck as well as CT perfusion.  Patient again reiterates, that he has symptoms if he moves his head back to fast, typically moving from front to back.  He feels like he wants to passed out, but never does.  He feels like it is difficult to explain.  Sometimes he feels like it is fatigue.  He feels like now that he is on aspirin, Plavix, and amlodipine he does feel like he feels slightly better.  He is unable to articulate any specific time and happens, he does feel like it happens maybe more in the morning time.  He denies any paresthesias that happened.  He feels like his brain just kind of goes blank.  He does note that he has blurry vision for about 10 minutes afterwards.  But it does not happen every single time.  And only happens some time.  This was a virtual audio visual visit, the patient's spouse is there to help augment his history, as well as patient is somewhat hard of hearing and she assists in that regard as well. The patient also reports.    I, Flora Paris, scribed for, and in the presence of, Kwame Goff MD. I performed the scribed service and the documentation accurately describes the services I performed. I attest to the accuracy of the note.      Interval history: 10/30/2023  Patient returned today for virtual audio visual to discuss 4-6 week angiogram results. The patient reports feeling well however, he expresses vision changes . The patient reports having foggy/blurred vision of both eyes, which disturbs full vision at night.The patient also reports when head is tilted back too fast he  feels pre-syncope. This occurs often when the patient prater his hair or walk down stairs. The patient also reports numbness and tingling of the left pinky and lower extremity pain of both legs. The patient denies difficulty fulfilling daily activities. This is the extent of the patient's complaints at this time.     Review of patient's allergies indicates:  No Known Allergies    Current Outpatient Medications   Medication Sig Dispense Refill    amLODIPine (NORVASC) 10 MG tablet Take 10 mg by mouth once daily.      aspirin (ECOTRIN) 81 MG EC tablet Take 81 mg by mouth once daily.      clopidogreL (PLAVIX) 75 mg tablet Take 75 mg by mouth once daily.      losartan (COZAAR) 50 MG tablet Take 50 mg by mouth once daily.      lovastatin (MEVACOR) 40 MG tablet Take 40 mg by mouth every evening.      metFORMIN (GLUCOPHAGE) 500 MG tablet Take 500 mg by mouth 2 (two) times daily with meals.      traMADoL (ULTRAM) 50 mg tablet Take 1 tablet (50 mg total) by mouth every 12 (twelve) hours as needed for Pain. (Patient not taking: Reported on 2/14/2023) 6 tablet 0     No current facility-administered medications for this visit.       Past Medical History:   Diagnosis Date    Diabetes mellitus     Hyperlipidemia     Hypertension      Past Surgical History:   Procedure Laterality Date    KIDNEY STONE SURGERY      NECK SURGERY       Family History    None       Social History     Socioeconomic History    Marital status:    Tobacco Use    Smoking status: Never   Substance and Sexual Activity    Alcohol use: Yes     Comment: occ    Drug use: Never       Review of Systems   Eyes:  Positive for visual disturbance (Whole eye, foggy).   Musculoskeletal:  Positive for myalgias (Lower extremities).   Neurological:  Positive for numbness (L pinky).   All other systems reviewed and are negative.      OBJECTIVE:     Vital Signs     There is no height or weight on file to calculate BMI.    Neurosurgery Physical Exam  On virtual audio  visual visit   Head is normocephalic atraumatic   Neck is grossly supple  No appreciable labored breathing   Admitting appears to be nontender   Patient is able to move extremities     On virtual audio visual visit the patient's speech is fluent, goal directed without any noted dysarthria or aphasia   Unable to evaluate pupils on virtual audio visual visit   Face is symmetric   Tongue is midline  Unable to evaluate palate   Hearing appears to be intact on virtual audio visual visit to voice   Patient able to move both upper and lower extremities without any gross motor asymmetry on virtual audio visual visit     Diagnostic Results:  I personally reviewed patient's Diagnostic Imaging.  IR Angiogram Carotid Cerebral Bilateral:     1. Six vessel cerebral angiogram, with provocative head turning and extension in the bilateral vertebral artery injections.  Short episode of bradycardia with head turning to the right vertebral artery injection, a drop of 10 mmHg and blood pressure with left vertebral artery injection.  No clear evidence of stenosis or vessel occlusion to account for patient's symptomatology.     2.  Some atherosclerotic changes in the left V4 segment prior to the vertebrobasilar junction, no appreciable flow from the left vertebral artery into the vertebrobasilar system.  Some evidence of atherosclerotic changes in the bilateral P1 segments, and proximal SCA branches.            ASSESSMENT/PLAN:     Follow-up for CTA in 6 months to 1 year.  Continue following medication regime of Aspirin and Plavix 75mg to alleviate symptoms.    Agustin Goff MD,MSc  Department of Neurosurgery   Department of Radiology  Department of Neurology  [facility]    [facility]   [employer] [facility]    Note dictated with voice recognition software, please excuse any grammatical errors.     Physician  Attestation for Scribe: I, Agustin Goff MD, reviewed documentation as scribed in my presence, which is both accurate and complete.

## 2023-11-01 ENCOUNTER — TELEPHONE (OUTPATIENT)
Dept: NEUROSURGERY | Facility: CLINIC | Age: 63
End: 2023-11-01
Payer: MEDICAID

## 2023-11-01 DIAGNOSIS — I66.01 MIDDLE CEREBRAL ARTERY STENOSIS, RIGHT: ICD-10-CM

## 2023-11-01 DIAGNOSIS — I63.89 OTHER CEREBRAL INFARCTION: Primary | ICD-10-CM

## 2023-11-02 ENCOUNTER — PATIENT MESSAGE (OUTPATIENT)
Dept: NEUROSURGERY | Facility: CLINIC | Age: 63
End: 2023-11-02
Payer: MEDICAID

## 2024-01-12 NOTE — PLAN OF CARE
01/12/24 1:43 PM    Patient contacted post ED visit, [facility] department spoke with patient/caregiver and outreach was successful.    Thank you.  Hilda Sarah  [facility]     Cerebral angiogram procedure completed. Patient tolerated well. Patient AAOx3, no distress noted, respirations even and unlabored, will continue to monitor. VSS. Mynx closure device deployed in right  femoral artery; hemostasis achieved at 1215. Patient to lay flat for 2 hours until 1415 on 09/28/23 per MD. Right groin site clean, dry, and intact; no bleeding or hematoma noted. Patient to be transferred to [facility] for post-procedural recovery per MD. Report to be given at bedside to RN.

## 2024-01-17 ENCOUNTER — TELEPHONE (OUTPATIENT)
Dept: CARDIOLOGY | Facility: CLINIC | Age: 64
End: 2024-01-17
Payer: MEDICARE

## 2024-01-17 NOTE — TELEPHONE ENCOUNTER
I  reached out  and spoke to patient ,    Regarding an appointment to See .    Appointment also mailed to patient home.    Patient confirmed appointment too.      Emily Adams (rita)            ----- Message from Jeanine Boyle sent at 1/17/2024 11:33 AM CST -----  Regarding: Pt called to speak to the nurse regarding his upcoming appt and would like a call back today  Patient Advice:    Pt called to speak to the nurse regarding his upcoming appt and would like a call back today    Pt can be reached at [phone]

## 2024-01-26 ENCOUNTER — TELEPHONE (OUTPATIENT)
Dept: CARDIOLOGY | Facility: CLINIC | Age: 64
End: 2024-01-26
Payer: MEDICARE

## 2024-01-26 DIAGNOSIS — I48.0 PAROXYSMAL ATRIAL FIBRILLATION: Primary | ICD-10-CM

## 2024-01-26 NOTE — TELEPHONE ENCOUNTER
----- Message from Daniel Kyle sent at 1/26/2024  1:32 PM CST -----  Contact: pt  .Type:  Needs Medical Advice    Who Called: pt    Would the patient rather a call back or a response via [facility]?  Call back  Best Call Back Number:  [phone]  Additional Information:  Pt.  is returning a call back luis armando regarding blood work and EKG

## 2024-01-26 NOTE — TELEPHONE ENCOUNTER
Reached out to patient and he wanted me to check    Our fax device and to see if his records came though.    No, theres no Medical records there.    Nw                    ----- Message from Shreya Caal sent at 1/26/2024  3:36 PM CST -----  Type:  Needs Medical Advice    Who Called: Pt   Would the patient rather a call back or a response via [facility]? Call back   Best Call Back Number: [phone]  Additional Information: Pt is requesting a callback from this provider office in regards to fax #

## 2024-01-26 NOTE — TELEPHONE ENCOUNTER
Returned pt call and spoke to him regarding     His Pcp is going to fax over patient  EKG & Lab     results. I checked our fax and didn't see anything    For patient.    Pt will keep me in the loop and call me later.      Nw                          ----- Message from Daniel Kyle sent at 1/26/2024  1:32 PM CST -----  Contact: pt  .Type:  Needs Medical Advice    Who Called: pt    Would the patient rather a call back or a response via [facility]?  Call back  Best Call Back Number:  [phone]  Additional Information:  Pt.  is returning a call back luis armando regarding blood work and EKG

## 2024-01-29 ENCOUNTER — OFFICE VISIT (OUTPATIENT)
Dept: CARDIOLOGY | Facility: CLINIC | Age: 64
End: 2024-01-29
Payer: MEDICARE

## 2024-01-29 VITALS
HEIGHT: 69 IN | WEIGHT: 191.13 LBS | SYSTOLIC BLOOD PRESSURE: 171 MMHG | OXYGEN SATURATION: 96 % | HEART RATE: 67 BPM | BODY MASS INDEX: 28.31 KG/M2 | DIASTOLIC BLOOD PRESSURE: 84 MMHG

## 2024-01-29 DIAGNOSIS — R00.2 PALPITATIONS: ICD-10-CM

## 2024-01-29 DIAGNOSIS — I48.0 PAROXYSMAL ATRIAL FIBRILLATION: ICD-10-CM

## 2024-01-29 DIAGNOSIS — I20.89 ANGINAL EQUIVALENT: Primary | ICD-10-CM

## 2024-01-29 DIAGNOSIS — I63.89 OTHER CEREBRAL INFARCTION: ICD-10-CM

## 2024-01-29 DIAGNOSIS — I66.01 MIDDLE CEREBRAL ARTERY STENOSIS, RIGHT: ICD-10-CM

## 2024-01-29 DIAGNOSIS — R55 PRE-SYNCOPE: ICD-10-CM

## 2024-01-29 DIAGNOSIS — I48.0 PAROXYSMAL ATRIAL FIBRILLATION: Primary | ICD-10-CM

## 2024-01-29 PROCEDURE — 93000 ELECTROCARDIOGRAM COMPLETE: CPT | Mod: S$GLB,,, | Performed by: INTERNAL MEDICINE

## 2024-01-29 PROCEDURE — 99204 OFFICE O/P NEW MOD 45 MIN: CPT | Mod: S$GLB,,, | Performed by: INTERNAL MEDICINE

## 2024-01-29 PROCEDURE — 99999 PR PBB SHADOW E&M-EST. PATIENT-LVL III: CPT | Mod: PBBFAC,,, | Performed by: INTERNAL MEDICINE

## 2024-01-29 NOTE — PROGRESS NOTES
"Subjective:   @Patient ID:  Monique Murguia is a 63 y.o. male who presents for evaluation of No chief complaint on file.      HPI:   2024:  Initial evaluation.  He was referred by Dr. Goff.  Patient was referred for cerebral arthrosclerosis.  He underwent cerebral angiogram with provocative  head turning and extension---> Short episode of bradycardia with head turning to the right vertebral artery injection, a drop of 10 mmHg and blood pressure with left vertebral artery injection.  No clear evidence of stenosis or vessel occlusion to account for patient's symptomatology.   Positional dizziness with head extension has been going on for years.  He describes it as presyncope.  Also reports ringing in the ear.  Stated he was evaluated by ENT.  No clear diagnosis given yet  Reports chronic dyspnea on exertion, low energy level.  No syncope    No to back abuse.  He has a remote history for brief tobacco abuse    Hypertension, BP is not well controlled.  However he does not monitor it at home          Prior cardiovascular  Hx  --------------------------------        There are no problems to display for this patient.          Right Arm BP - Sittin/97  Left Arm BP - Sittin/84        LAST HbA1c  No results found for: "HGBA1C"    Lipid panel  No results found for: "CHOL"  No results found for: "HDL"  No results found for: "LDLCALC"  No results found for: "TRIG"  No results found for: "CHOLHDL"         Review of Systems   Constitutional: Negative for chills and fever.   HENT:  Negative for hearing loss and nosebleeds.    Eyes:  Negative for blurred vision.   Cardiovascular:         As in HPI   Respiratory:  Negative for hemoptysis and shortness of breath.    Hematologic/Lymphatic: Negative for bleeding problem.   Skin:  Negative for itching.   Musculoskeletal:  Negative for falls.   Gastrointestinal:  Negative for abdominal pain and hematochezia.   Genitourinary:  Negative for hematuria.   Neurological:     "     As in HPI   Psychiatric/Behavioral:  Negative for altered mental status and depression.        Objective:   Physical Exam  Constitutional:       Appearance: He is well-developed.   HENT:      Head: Normocephalic and atraumatic.   Eyes:      Conjunctiva/sclera: Conjunctivae normal.   Neck:      Vascular: No carotid bruit or JVD.   Cardiovascular:      Rate and Rhythm: Normal rate and regular rhythm.      Pulses:           Carotid pulses are 2+ on the right side and 2+ on the left side.       Radial pulses are 2+ on the right side and 2+ on the left side.      Heart sounds: Normal heart sounds. No murmur heard.     No friction rub. No gallop.   Pulmonary:      Effort: Pulmonary effort is normal. No respiratory distress.      Breath sounds: Normal breath sounds. No stridor. No wheezing.   Musculoskeletal:      Cervical back: Neck supple.   Skin:     General: Skin is warm and dry.   Neurological:      Mental Status: He is alert and oriented to person, place, and time.   Psychiatric:         Behavior: Behavior normal.         Assessment:     1. Anginal equivalent    2. Other cerebral infarction    3. Middle cerebral artery stenosis, right    4. Pre-syncope    5. Palpitations        Plan:   No clear diagnosis yet.  He has multiple constellation of symptoms  Concerned about his angina equivalent.  We will proceed with a stress echocardiogram for risk stratification    Positional dizziness could be related to inner ear versus cardiovascular.  Cerebral angiogram without significant findings except some change in heart rate and blood pressure was injecting the vertebral arteries.  We will check tilt table test.  I did a carotid massage at the office was not able to reproduce the same symptoms    Check 30 days EM   Need close f/u with ENT   Continue Statin, Plavix      F/U after the work up completed     Pertinent cardiac images and EKG reviewed independently.    Continue with current medical plan and lifestyle  changes.  Return sooner for concerns or questions. If symptoms persist go to the ED  I have reviewed all pertinent data including patient's medical history in detail and updated the computerized patient record.     Orders Placed This Encounter   Procedures    Tilt table     Standing Status:   Future     Standing Expiration Date:   1/29/2025     Order Specific Question:   Release to patient     Answer:   Immediate    Cardiac event monitor     Standing Status:   Future     Standing Expiration Date:   1/29/2025     Order Specific Question:   Cardiac Event Monitor     Answer:   Auto Trigger     Order Specific Question:   Release to patient     Answer:   Immediate    Stress Echo Which stress agent will be used? Treadmill Exercise; Color Flow Doppler? Yes     Standing Status:   Future     Standing Expiration Date:   1/29/2025     Order Specific Question:   Which stress agent will be used?     Answer:   Treadmill Exercise     Order Specific Question:   Color Flow Doppler?     Answer:   Yes     Order Specific Question:   Release to patient     Answer:   Immediate       Follow up as scheduled.     He expressed verbal understanding and agreed with the plan    Patient's Medications   New Prescriptions    No medications on file   Previous Medications    AMLODIPINE (NORVASC) 10 MG TABLET    Take 10 mg by mouth once daily.    ASPIRIN (ECOTRIN) 81 MG EC TABLET    Take 81 mg by mouth once daily.    CLOPIDOGREL (PLAVIX) 75 MG TABLET    Take 75 mg by mouth once daily.    LOSARTAN (COZAAR) 50 MG TABLET    Take 50 mg by mouth once daily.    LOVASTATIN (MEVACOR) 40 MG TABLET    Take 40 mg by mouth every evening.    METFORMIN (GLUCOPHAGE) 500 MG TABLET    Take 500 mg by mouth 2 (two) times daily with meals.    TRAMADOL (ULTRAM) 50 MG TABLET    Take 1 tablet (50 mg total) by mouth every 12 (twelve) hours as needed for Pain.   Modified Medications    No medications on file   Discontinued Medications    No medications on file

## 2024-02-06 ENCOUNTER — CLINICAL SUPPORT (OUTPATIENT)
Dept: CARDIOLOGY | Facility: HOSPITAL | Age: 64
End: 2024-02-06
Attending: INTERNAL MEDICINE
Payer: MEDICARE

## 2024-02-06 DIAGNOSIS — R00.2 PALPITATIONS: ICD-10-CM

## 2024-02-06 DIAGNOSIS — R55 PRE-SYNCOPE: ICD-10-CM

## 2024-03-14 ENCOUNTER — DOCUMENTATION ONLY (OUTPATIENT)
Dept: CARDIOLOGY | Facility: HOSPITAL | Age: 64
End: 2024-03-14
Payer: MEDICARE

## 2024-03-14 NOTE — PROGRESS NOTES
Per [employer], patient received 30 day event monitor by mail; however, did not proceed with testing.

## 2024-03-26 ENCOUNTER — HOSPITAL ENCOUNTER (OUTPATIENT)
Dept: CARDIOLOGY | Facility: HOSPITAL | Age: 64
Discharge: HOME | End: 2024-03-26
Attending: INTERNAL MEDICINE
Payer: MEDICARE

## 2024-03-26 VITALS — WEIGHT: 191 LBS | HEIGHT: 69 IN | BODY MASS INDEX: 28.29 KG/M2

## 2024-03-26 DIAGNOSIS — R55 PRE-SYNCOPE: ICD-10-CM

## 2024-03-26 DIAGNOSIS — I20.89 ANGINAL EQUIVALENT: ICD-10-CM

## 2024-03-26 PROCEDURE — 93325 DOPPLER ECHO COLOR FLOW MAPG: CPT | Mod: 26,,, | Performed by: INTERNAL MEDICINE

## 2024-03-26 PROCEDURE — 93320 DOPPLER ECHO COMPLETE: CPT | Mod: 26,,, | Performed by: INTERNAL MEDICINE

## 2024-03-26 PROCEDURE — 93325 DOPPLER ECHO COLOR FLOW MAPG: CPT

## 2024-03-26 PROCEDURE — 93351 STRESS TTE COMPLETE: CPT | Mod: 26,,, | Performed by: INTERNAL MEDICINE

## 2024-03-26 PROCEDURE — 93660 TILT TABLE EVALUATION: CPT

## 2024-03-26 PROCEDURE — 93660 TILT TABLE EVALUATION: CPT | Mod: 26,,, | Performed by: INTERNAL MEDICINE

## 2024-03-27 LAB
ASCENDING AORTA: 3.11 CM
AV INDEX (PROSTH): 0.8
AV MEAN GRADIENT: 4 MMHG
AV PEAK GRADIENT: 6 MMHG
AV REGURGITATION PRESSURE HALF TIME: 751.06 MS
AV VALVE AREA BY VELOCITY RATIO: 2.76 CM²
AV VALVE AREA: 2.84 CM²
AV VELOCITY RATIO: 0.78
BSA FOR ECHO PROCEDURE: 2.05 M2
CV ECHO LV RWT: 0.42 CM
CV STRESS BASE HR: 58 BPM
DIASTOLIC BLOOD PRESSURE: 86 MMHG
DOP CALC AO PEAK VEL: 1.25 M/S
DOP CALC AO VTI: 26.7 CM
DOP CALC LVOT AREA: 3.6 CM2
DOP CALC LVOT DIAMETER: 2.13 CM
DOP CALC LVOT PEAK VEL: 0.97 M/S
DOP CALC LVOT STROKE VOLUME: 75.86 CM3
DOP CALC MV VTI: 17.9 CM
DOP CALCLVOT PEAK VEL VTI: 21.3 CM
E WAVE DECELERATION TIME: 215.02 MSEC
E/A RATIO: 0.86
E/E' RATIO: 11.64 M/S
ECHO LV POSTERIOR WALL: 0.89 CM (ref 0.6–1.1)
FRACTIONAL SHORTENING: 40 % (ref 28–44)
INTERVENTRICULAR SEPTUM: 1.11 CM (ref 0.6–1.1)
IVC DIAMETER: 1.77 CM
LA MAJOR: 5.24 CM
LA MINOR: 5.33 CM
LA WIDTH: 3.6 CM
LEFT ATRIUM SIZE: 2.64 CM
LEFT ATRIUM VOLUME INDEX MOD: 18.5 ML/M2
LEFT ATRIUM VOLUME INDEX: 21 ML/M2
LEFT ATRIUM VOLUME MOD: 37.59 CM3
LEFT ATRIUM VOLUME: 42.69 CM3
LEFT INTERNAL DIMENSION IN SYSTOLE: 2.5 CM (ref 2.1–4)
LEFT VENTRICLE DIASTOLIC VOLUME INDEX: 38.77 ML/M2
LEFT VENTRICLE DIASTOLIC VOLUME: 78.71 ML
LEFT VENTRICLE MASS INDEX: 68 G/M2
LEFT VENTRICLE SYSTOLIC VOLUME INDEX: 11 ML/M2
LEFT VENTRICLE SYSTOLIC VOLUME: 22.3 ML
LEFT VENTRICULAR INTERNAL DIMENSION IN DIASTOLE: 4.2 CM (ref 3.5–6)
LEFT VENTRICULAR MASS: 137.25 G
LV LATERAL E/E' RATIO: 10.67 M/S
LV SEPTAL E/E' RATIO: 12.8 M/S
LVOT MG: 2.09 MMHG
LVOT MV: 0.68 CM/S
MV A" WAVE DURATION": 145.58 MSEC
MV MEAN GRADIENT: 1 MMHG
MV PEAK A VEL: 0.74 M/S
MV PEAK E VEL: 0.64 M/S
MV PEAK GRADIENT: 2 MMHG
MV STENOSIS PRESSURE HALF TIME: 62.35 MS
MV VALVE AREA BY CONTINUITY EQUATION: 4.24 CM2
MV VALVE AREA P 1/2 METHOD: 3.53 CM2
OHS CV CPX 1 MINUTE RECOVERY HEART RATE: 107 BPM
OHS CV CPX 85 PERCENT MAX PREDICTED HEART RATE MALE: 132
OHS CV CPX ESTIMATED METS: 11
OHS CV CPX MAX PREDICTED HEART RATE: 155
OHS CV CPX PATIENT IS FEMALE: 0
OHS CV CPX PATIENT IS MALE: 1
OHS CV CPX PEAK DIASTOLIC BLOOD PRESSURE: 93 MMHG
OHS CV CPX PEAK HEAR RATE: 148 BPM
OHS CV CPX PEAK RATE PRESSURE PRODUCT: ABNORMAL
OHS CV CPX PEAK SYSTOLIC BLOOD PRESSURE: 202 MMHG
OHS CV CPX PERCENT MAX PREDICTED HEART RATE ACHIEVED: 95
OHS CV CPX RATE PRESSURE PRODUCT PRESENTING: 8584
OHS LV EJECTION FRACTION SIMPSONS BIPLANE MOD: 69 %
PISA AR MAX VEL: 3.62 M/S
PISA TR MAX VEL: 2.31 M/S
PULM VEIN S/D RATIO: 1.43
PV MV: 0.92 M/S
PV PEAK D VEL: 0.35 M/S
PV PEAK GRADIENT: 6 MMHG
PV PEAK S VEL: 0.5 M/S
PV PEAK VELOCITY: 1.27 M/S
RA MAJOR: 4.53 CM
RA PRESSURE ESTIMATED: 3 MMHG
RA WIDTH: 3.31 CM
RIGHT VENTRICLE DIASTOLIC MID DIMENSION: 2.3 CM
RIGHT VENTRICULAR END-DIASTOLIC DIMENSION: 3.5 CM
RV TB RVSP: 5 MMHG
RV TISSUE DOPPLER FREE WALL SYSTOLIC VELOCITY 1 (APICAL 4 CHAMBER VIEW): 13 CM/S
SINUS: 3.7 CM
STJ: 3.16 CM
STRESS ECHO POST EXERCISE DUR MIN: 9 MINUTES
STRESS ECHO POST EXERCISE DUR SEC: 26 SECONDS
SYSTOLIC BLOOD PRESSURE: 148 MMHG
TDI LATERAL: 0.06 M/S
TDI SEPTAL: 0.05 M/S
TDI: 0.06 M/S
TR MAX PG: 21 MMHG
TRICUSPID ANNULAR PLANE SYSTOLIC EXCURSION: 2.01 CM
TV REST PULMONARY ARTERY PRESSURE: 24 MMHG
Z-SCORE OF LEFT VENTRICULAR DIMENSION IN END DIASTOLE: -3.59
Z-SCORE OF LEFT VENTRICULAR DIMENSION IN END SYSTOLE: -3.07

## 2024-03-28 ENCOUNTER — TELEPHONE (OUTPATIENT)
Dept: CARDIOLOGY | Facility: CLINIC | Age: 64
End: 2024-03-28
Payer: MEDICARE

## 2024-03-28 NOTE — TELEPHONE ENCOUNTER
----- Message from Humza Ram MD sent at 3/28/2024 12:40 PM CDT -----  Your tilt-table test is normal

## 2024-03-28 NOTE — TELEPHONE ENCOUNTER
----- Message from Gertrudis Diallo sent at 3/28/2024  1:55 PM CDT -----  Type:  Test Results    Who Called:  pt   Name of Test (Lab/Mammo/Etc): stress test   Date of Test: 03/26/24  Ordering Provider:  Dr Ram    Where the test was performed[facility]  Would the patient rather a call back or a response via [facility]?  Call back   Best Call Back Number: [phone]  Additional Information:

## 2024-03-28 NOTE — TELEPHONE ENCOUNTER
Left a voicemail to call back at patient's convenience.  Left a voice message with Stress and Tilt Table result.  Patient can call back for any concern

## 2024-03-28 NOTE — TELEPHONE ENCOUNTER
Left a voicemail to call back at patient's convenience.  Left a voice message with Stress and Tilt Table result.  Patient can call back for any concern.

## 2024-03-28 NOTE — TELEPHONE ENCOUNTER
"Attempt to return patient's call.  Left a voicemail with results and to call back at patient's convenience.     As per Dr Ram  "Stress test result is good. No major blockage to interfere with the blood flow to the heart muscle and your tilt-table test is normal".    "

## 2024-03-28 NOTE — PROGRESS NOTES
Please call the patient with the results    Stress test result is good. No major blockage to interfere with the blood flow to the heart muscle     Sincerely,  Humza Ram MD.   Interventional Cardiologist  [facility], [place]

## 2024-03-28 NOTE — TELEPHONE ENCOUNTER
----- Message from Humza Ram MD sent at 3/28/2024 12:40 PM CDT -----  Please call the patient with the results    Stress test result is good. No major blockage to interfere with the blood flow to the heart muscle     Sincerely,  Humza Ram MD.   Interventional Cardiologist  [facility], [place]

## 2024-05-07 ENCOUNTER — TELEPHONE (OUTPATIENT)
Dept: CARDIOLOGY | Facility: CLINIC | Age: 64
End: 2024-05-07
Payer: MEDICARE

## 2024-05-07 NOTE — TELEPHONE ENCOUNTER
----- Message from Leesa Montez sent at 5/7/2024  8:59 AM CDT -----  Regarding: Call back  Contact: [phone]  Type:  Patient Returning Call    Who Called: PT   Who Left Message for Patient: Nurse   Does the patient know what this is regarding?: Yes   Would the patient rather a call back or a response via [facility]? Call back   Best Call Back Number: [phone]   Additional Information:

## 2024-05-09 ENCOUNTER — OFFICE VISIT (OUTPATIENT)
Dept: CARDIOLOGY | Facility: CLINIC | Age: 64
End: 2024-05-09
Payer: MEDICARE

## 2024-05-09 VITALS
SYSTOLIC BLOOD PRESSURE: 131 MMHG | HEIGHT: 69 IN | HEART RATE: 55 BPM | DIASTOLIC BLOOD PRESSURE: 80 MMHG | WEIGHT: 180 LBS | BODY MASS INDEX: 26.66 KG/M2

## 2024-05-09 DIAGNOSIS — I63.89 OTHER CEREBRAL INFARCTION: Primary | ICD-10-CM

## 2024-05-09 DIAGNOSIS — R42 DIZZINESS: ICD-10-CM

## 2024-05-09 DIAGNOSIS — I66.01 MIDDLE CEREBRAL ARTERY STENOSIS, RIGHT: ICD-10-CM

## 2024-05-09 PROCEDURE — 3288F FALL RISK ASSESSMENT DOCD: CPT | Mod: CPTII,S$GLB,, | Performed by: INTERNAL MEDICINE

## 2024-05-09 PROCEDURE — 1101F PT FALLS ASSESS-DOCD LE1/YR: CPT | Mod: CPTII,S$GLB,, | Performed by: INTERNAL MEDICINE

## 2024-05-09 PROCEDURE — 4010F ACE/ARB THERAPY RXD/TAKEN: CPT | Mod: CPTII,S$GLB,, | Performed by: INTERNAL MEDICINE

## 2024-05-09 PROCEDURE — 3079F DIAST BP 80-89 MM HG: CPT | Mod: CPTII,S$GLB,, | Performed by: INTERNAL MEDICINE

## 2024-05-09 PROCEDURE — 3075F SYST BP GE 130 - 139MM HG: CPT | Mod: CPTII,S$GLB,, | Performed by: INTERNAL MEDICINE

## 2024-05-09 PROCEDURE — 1126F AMNT PAIN NOTED NONE PRSNT: CPT | Mod: CPTII,S$GLB,, | Performed by: INTERNAL MEDICINE

## 2024-05-09 PROCEDURE — 99999 PR PBB SHADOW E&M-EST. PATIENT-LVL III: CPT | Mod: PBBFAC,,, | Performed by: INTERNAL MEDICINE

## 2024-05-09 PROCEDURE — 99214 OFFICE O/P EST MOD 30 MIN: CPT | Mod: S$GLB,,, | Performed by: INTERNAL MEDICINE

## 2024-05-09 PROCEDURE — 1159F MED LIST DOCD IN RCRD: CPT | Mod: CPTII,S$GLB,, | Performed by: INTERNAL MEDICINE

## 2024-05-09 PROCEDURE — 3008F BODY MASS INDEX DOCD: CPT | Mod: CPTII,S$GLB,, | Performed by: INTERNAL MEDICINE

## 2024-05-09 NOTE — PROGRESS NOTES
Subjective:   @Patient ID:  Monique Murguia is a 63 y.o. male who presents for evaluation of No chief complaint on file.      HPI:   May 2024:  Follow up.  He is doing well.  No syncope or presyncope.  Stress echo and tilt-table test are normal.  He did not do the 30 days event monitor due to scheduling issues that will interfere with his work .  He prefers shorter duration.  Still reports the same symptoms of presyncope with head extension to the backward, usually after 1-2 minutes he feels lightheaded.  Not all the time.  He has seen ENT in the past      January 2024:  Initial evaluation.  He was referred by Dr. Goff.  Patient was referred for cerebral arthrosclerosis.  He underwent cerebral angiogram with provocative  head turning and extension---> Short episode of bradycardia with head turning to the right vertebral artery injection, a drop of 10 mmHg and blood pressure with left vertebral artery injection.  No clear evidence of stenosis or vessel occlusion to account for patient's symptomatology.   Positional dizziness with head extension has been going on for years.  He describes it as presyncope.  Also reports ringing in the ear.  Stated he was evaluated by ENT.  No clear diagnosis given yet  Reports chronic dyspnea on exertion, low energy level.  No syncope    No to back abuse.  He has a remote history for brief tobacco abuse    Hypertension, BP is not well controlled.  However he does not monitor it at home          Prior cardiovascular  Hx  --------------------------------  Stress echo March 2024    Left Ventricle: There is normal systolic function. Biplane (2D) method of discs ejection fraction is 69%. There is normal diastolic function.      Stress Protocol: The patient exercised for 9 minutes 26 seconds on a Rolf protocol, corresponding to a functional capacity of 11 METS, achieving a peak heart rate of 148 bpm, which is 95 % of the age predicted maximum heart rate. The patient experienced no angina  "during the test. Their exercise capacity was normal. The patient reported no symptoms during the stress test. The test was stopped because the patient requested it.    Baseline ECG: The Baseline ECG reveals sinus rhythm. The axis is normal. The ST segments are normal.    Stress ECG: There are no ST segment deviation identified during the protocol. There are no arrhythmias during stress.    ECG Conclusion: The ECG portion of the study is negative for ischemia.    Post-stress Impression: The study is negative with no echocardiographic evidence of stress induced ischemia.    Tilt-table test 2024      Negative tilt table test for vasovagal syncope    Essentially negative tilt table test       Patient Active Problem List    Diagnosis Date Noted    Middle cerebral artery stenosis, right 2024    Other cerebral infarction 2024           Right Arm BP - Sittin/86  Left Arm BP - Sittin/80        LAST HbA1c  No results found for: "HGBA1C"    Lipid panel  No results found for: "CHOL"  No results found for: "HDL"  No results found for: "LDLCALC"  No results found for: "TRIG"  No results found for: "CHOLHDL"         Review of Systems   Constitutional: Negative for chills and fever.   HENT:  Negative for hearing loss and nosebleeds.    Eyes:  Negative for blurred vision.   Cardiovascular:         As in HPI   Respiratory:  Negative for hemoptysis and shortness of breath.    Hematologic/Lymphatic: Negative for bleeding problem.   Skin:  Negative for itching.   Musculoskeletal:  Negative for falls.   Gastrointestinal:  Negative for abdominal pain and hematochezia.   Genitourinary:  Negative for hematuria.   Neurological:         As in HPI   Psychiatric/Behavioral:  Negative for altered mental status and depression.        Objective:   Physical Exam  Constitutional:       Appearance: He is well-developed.   HENT:      Head: Normocephalic and atraumatic.   Eyes:      Conjunctiva/sclera: Conjunctivae normal. "   Neck:      Vascular: No carotid bruit or JVD.   Cardiovascular:      Rate and Rhythm: Normal rate and regular rhythm.      Pulses:           Carotid pulses are 2+ on the right side and 2+ on the left side.       Radial pulses are 2+ on the right side and 2+ on the left side.      Heart sounds: Normal heart sounds. No murmur heard.     No friction rub. No gallop.   Pulmonary:      Effort: Pulmonary effort is normal. No respiratory distress.      Breath sounds: Normal breath sounds. No stridor. No wheezing.   Musculoskeletal:      Cervical back: Neck supple.   Skin:     General: Skin is warm and dry.   Neurological:      Mental Status: He is alert and oriented to person, place, and time.   Psychiatric:         Behavior: Behavior normal.         Assessment:     1. Other cerebral infarction    2. Middle cerebral artery stenosis, right    3. Dizziness          Plan:   No clear diagnosis yet.      Positional dizziness could be related to inner ear versus cardiovascular.  Cerebral angiogram without significant findings except some change in heart rate and blood pressure was injecting the vertebral arteries.  tilt table test was negative.  I did a carotid massage at the office was not able to reproduce the same symptoms    We will check 48 hours Holter.  Instructed to try to extend his head while having the monitor on and press the alert button    Need close f/u with ENT   Continue Statin, Plavix     Pertinent cardiac images and EKG reviewed independently.    Continue with current medical plan and lifestyle changes.  Return sooner for concerns or questions. If symptoms persist go to the ED  I have reviewed all pertinent data including patient's medical history in detail and updated the computerized patient record.     Orders Placed This Encounter   Procedures    Holter monitor - 48 hour     Standing Status:   Future     Standing Expiration Date:   5/9/2025       Follow up as scheduled.     He expressed verbal  understanding and agreed with the plan    Patient's Medications   New Prescriptions    No medications on file   Previous Medications    AMLODIPINE (NORVASC) 10 MG TABLET    Take 10 mg by mouth once daily.    ASPIRIN (ECOTRIN) 81 MG EC TABLET    Take 81 mg by mouth once daily.    CLOPIDOGREL (PLAVIX) 75 MG TABLET    Take 75 mg by mouth once daily.    LOSARTAN (COZAAR) 50 MG TABLET    Take 50 mg by mouth once daily.    LOVASTATIN (MEVACOR) 40 MG TABLET    Take 40 mg by mouth every evening.    METFORMIN (GLUCOPHAGE) 500 MG TABLET    Take 500 mg by mouth 2 (two) times daily with meals.    TRAMADOL (ULTRAM) 50 MG TABLET    Take 1 tablet (50 mg total) by mouth every 12 (twelve) hours as needed for Pain.   Modified Medications    No medications on file   Discontinued Medications    No medications on file

## 2024-06-07 ENCOUNTER — HOSPITAL ENCOUNTER (OUTPATIENT)
Dept: CARDIOLOGY | Facility: HOSPITAL | Age: 64
Discharge: HOME | End: 2024-06-07
Attending: INTERNAL MEDICINE
Payer: MEDICARE

## 2024-06-07 DIAGNOSIS — R42 DIZZINESS: ICD-10-CM

## 2024-06-07 DIAGNOSIS — I63.89 OTHER CEREBRAL INFARCTION: ICD-10-CM

## 2024-06-07 PROCEDURE — 93225 XTRNL ECG REC<48 HRS REC: CPT

## 2024-06-07 PROCEDURE — 93227 XTRNL ECG REC<48 HR R&I: CPT | Mod: ,,, | Performed by: INTERNAL MEDICINE

## 2024-06-12 ENCOUNTER — TELEPHONE (OUTPATIENT)
Dept: CARDIOLOGY | Facility: CLINIC | Age: 64
End: 2024-06-12
Payer: MEDICARE

## 2024-06-12 LAB
OHS CV EVENT MONITOR DAY: 0
OHS CV HOLTER LENGTH DECIMAL HOURS: 47.98
OHS CV HOLTER LENGTH HOURS: 47
OHS CV HOLTER LENGTH MINUTES: 59
OHS CV HOLTER SINUS AVERAGE HR: 71
OHS CV HOLTER SINUS MAX HR: 156
OHS CV HOLTER SINUS MIN HR: 50

## 2024-06-12 NOTE — TELEPHONE ENCOUNTER
Sent patient a my chart message  Monitor results is normal.  No significant arrhythmia           ----- Message from Humza Ram MD sent at 6/12/2024  2:44 PM CDT -----   Monitor results is normal.  No significant arrhythmia